# Patient Record
Sex: MALE | Race: WHITE
[De-identification: names, ages, dates, MRNs, and addresses within clinical notes are randomized per-mention and may not be internally consistent; named-entity substitution may affect disease eponyms.]

---

## 2019-01-11 ENCOUNTER — HOSPITAL ENCOUNTER (OUTPATIENT)
Dept: HOSPITAL 10 - GIL | Age: 54
Discharge: HOME | End: 2019-01-11
Attending: INTERNAL MEDICINE
Payer: COMMERCIAL

## 2019-01-11 ENCOUNTER — HOSPITAL ENCOUNTER (OUTPATIENT)
Dept: HOSPITAL 91 - GIL | Age: 54
Discharge: HOME | End: 2019-01-11
Payer: COMMERCIAL

## 2019-01-11 VITALS — SYSTOLIC BLOOD PRESSURE: 134 MMHG | RESPIRATION RATE: 14 BRPM | DIASTOLIC BLOOD PRESSURE: 81 MMHG | HEART RATE: 85 BPM

## 2019-01-11 VITALS
HEIGHT: 66 IN | HEIGHT: 66 IN | WEIGHT: 146.39 LBS | WEIGHT: 146.39 LBS | BODY MASS INDEX: 23.53 KG/M2 | BODY MASS INDEX: 23.53 KG/M2

## 2019-01-11 DIAGNOSIS — K64.8: Primary | ICD-10-CM

## 2019-01-11 PROCEDURE — 88305 TISSUE EXAM BY PATHOLOGIST: CPT

## 2019-01-11 PROCEDURE — 45380 COLONOSCOPY AND BIOPSY: CPT

## 2019-01-11 NOTE — PREAC
Date/Time of Note


Date/Time of Note


DATE: 1/11/19 


TIME: 15:46





Anesthesia Eval and Record


Evaluation


Time Pre-Procedure Interview


DATE: 1/11/19 


TIME: 15:46


Age


53


Sex


male


NPO:  8 hrs


Preoperative diagnosis


Ulcerative Colitis


Planned procedure


Colonoscopy





Past Medical History


Past Medical History:  Includes


Pulm:  Sleep Apnea (no CPAP at home is been corrected)


Neuro:  Other (ADHD)


GI:  GERD, Other (Ulcerative Colitis, anal fissure)


Infection(s):  HIV





Surgery & Anesthesia Issues


No known issue





Meds


Anticoagulation:  No


Beta Blocker within 24 hr:  No


Reason Beta Blocker not given:  Pt. not on B-Blocker


Reported Medications


[Adderall]   No Conflict Check


   1/11/19


[Valacyclovir]   No Conflict Check


   1/11/19


[Crestor]   No Conflict Check


   1/11/19


[Bupropion Hcl]   No Conflict Check


   1/11/19


Citalopram Hydrobromide* (Citalopram Hydrobromide*) 20 Mg Tablet, 20 MG PO 


DAILY, #30 TAB


   1/11/19


[Testim]   No Conflict Check


   1/11/19


[Aspirin 81]   No Conflict Check


   1/11/19


[Sildenafil Citrate ]   No Conflict Check


   1/11/19


[Fish Oil ]   No Conflict Check


   1/11/19


[Multivitamin]   No Conflict Check


   1/11/19


[Omeprazole]   No Conflict Check


   1/11/19


[Lialda]   No Conflict Check


   1/11/19


Meds reviewed:  Yes





Allergies


Coded Allergies:  


     Cephalosporins (Verified  Allergy, Unknown, 1/11/19)


Allergies Reviewed:  Yes





Labs/Studies


Labs Reviewed:  Reviewed by anesthesiologist


Pregnancy test:  N/A


Studies:  ECG (n/a), CXR (n/a)





Pre-procedure Exam


Last vitals





Vital Signs


  Date      Temp  Pulse  Resp  B/P (MAP)   Pulse Ox  O2          O2 Flow    FiO2


Time                                                 Delivery    Rate


   1/11/19  98.6     85    14      134/81        98  Room Air


     15:27                           (98)





Airway:  Adequate mouth opening, Adequate thyromental dist


Mallampati:  Mallampati II


Teeth:  Normal


Lung:  Normal


Heart:  Normal





ASA Physical Status


ASA physical status:  3


Emergency:  None





Planned Anesthetic


General/MAC:  MAC





Planned Pain Management


Parenteral pain med





Pre-operative Attestations


Prior to commencing anesthesia and surgery, the patient was re-evaluated, there 


was verification of:


*The patient's identity


*The results of appropriate recent lab work and preoperative vital signs


*The above evaluation not changing prior to induction


*Anesthetic plan, risk benefits, alternative and complications discussed with 


patient/family; questions answered; patient/family understands, accepts and 


wishes to proceed.











KATT CHEUNG MD              Jan 11, 2019 15:49

## 2024-09-02 NOTE — PAC
Date/Time of Note


Date/Time of Note


DATE: 1/11/19 


TIME: 16:13





Post-Anesthesia Notes


Post-Anesthesia Note


Last documented vital signs





Vital Signs


  Date      Temp  Pulse  Resp  B/P (MAP)   Pulse Ox  O2          O2 Flow    FiO2


Time                                                 Delivery    Rate


   1/11/19  98.6     85    14      134/81        98  Room Air


     16:27                           (98)





Activity:  WNL


Respiratory function:  WNL


Cardiovascular function:  WNL


Mental status:  Baseline


Pain reasonably controlled:  Yes


Hydration appropriate:  Yes


Nausea/Vomiting absent:  Yes











KATT CHEUNG MD              Jan 11, 2019 16:14 No